# Patient Record
Sex: FEMALE | Race: WHITE
[De-identification: names, ages, dates, MRNs, and addresses within clinical notes are randomized per-mention and may not be internally consistent; named-entity substitution may affect disease eponyms.]

---

## 2019-08-05 ENCOUNTER — HOSPITAL ENCOUNTER (OUTPATIENT)
Dept: HOSPITAL 95 - LAB SHORT | Age: 64
Discharge: HOME | End: 2019-08-05
Attending: INTERNAL MEDICINE
Payer: MEDICARE

## 2019-08-05 DIAGNOSIS — Z00.01: Primary | ICD-10-CM

## 2019-08-07 LAB — PROT UR-MCNC: 5.9 MG/DL

## 2019-09-06 ENCOUNTER — HOSPITAL ENCOUNTER (OUTPATIENT)
Dept: HOSPITAL 95 - ORSCSDS | Age: 64
Discharge: HOME | End: 2019-09-06
Attending: INTERNAL MEDICINE
Payer: MEDICARE

## 2019-09-06 VITALS — HEIGHT: 65.98 IN | BODY MASS INDEX: 32.14 KG/M2 | WEIGHT: 199.96 LBS

## 2019-09-06 DIAGNOSIS — E11.9: ICD-10-CM

## 2019-09-06 DIAGNOSIS — K64.8: ICD-10-CM

## 2019-09-06 DIAGNOSIS — Z87.891: ICD-10-CM

## 2019-09-06 DIAGNOSIS — D12.4: ICD-10-CM

## 2019-09-06 DIAGNOSIS — D12.0: ICD-10-CM

## 2019-09-06 DIAGNOSIS — K57.30: ICD-10-CM

## 2019-09-06 DIAGNOSIS — J44.9: ICD-10-CM

## 2019-09-06 DIAGNOSIS — D12.2: ICD-10-CM

## 2019-09-06 DIAGNOSIS — K92.1: Primary | ICD-10-CM

## 2019-09-06 DIAGNOSIS — I10: ICD-10-CM

## 2019-09-06 DIAGNOSIS — Z79.899: ICD-10-CM

## 2019-09-06 DIAGNOSIS — E78.5: ICD-10-CM

## 2019-09-06 DIAGNOSIS — D12.8: ICD-10-CM

## 2019-09-06 PROCEDURE — 0DBK8ZX EXCISION OF ASCENDING COLON, VIA NATURAL OR ARTIFICIAL OPENING ENDOSCOPIC, DIAGNOSTIC: ICD-10-PCS | Performed by: INTERNAL MEDICINE

## 2019-09-06 PROCEDURE — 0DBH8ZX EXCISION OF CECUM, VIA NATURAL OR ARTIFICIAL OPENING ENDOSCOPIC, DIAGNOSTIC: ICD-10-PCS | Performed by: INTERNAL MEDICINE

## 2019-09-06 PROCEDURE — 0DBP8ZX EXCISION OF RECTUM, VIA NATURAL OR ARTIFICIAL OPENING ENDOSCOPIC, DIAGNOSTIC: ICD-10-PCS | Performed by: INTERNAL MEDICINE

## 2019-09-06 PROCEDURE — 0DBM8ZX EXCISION OF DESCENDING COLON, VIA NATURAL OR ARTIFICIAL OPENING ENDOSCOPIC, DIAGNOSTIC: ICD-10-PCS | Performed by: INTERNAL MEDICINE

## 2019-12-04 ENCOUNTER — HOSPITAL ENCOUNTER (OUTPATIENT)
Dept: HOSPITAL 95 - ORSCSDS | Age: 64
Discharge: HOME | End: 2019-12-04
Attending: OTOLARYNGOLOGY
Payer: MEDICARE

## 2019-12-04 VITALS — HEIGHT: 65.98 IN | BODY MASS INDEX: 34.69 KG/M2 | WEIGHT: 215.83 LBS

## 2019-12-04 DIAGNOSIS — J44.9: ICD-10-CM

## 2019-12-04 DIAGNOSIS — E66.9: ICD-10-CM

## 2019-12-04 DIAGNOSIS — E11.9: ICD-10-CM

## 2019-12-04 DIAGNOSIS — Z79.899: ICD-10-CM

## 2019-12-04 DIAGNOSIS — D37.02: Primary | ICD-10-CM

## 2019-12-04 DIAGNOSIS — Z87.891: ICD-10-CM

## 2019-12-04 DIAGNOSIS — I10: ICD-10-CM

## 2019-12-04 PROCEDURE — 0CBM8ZX EXCISION OF PHARYNX, VIA NATURAL OR ARTIFICIAL OPENING ENDOSCOPIC, DIAGNOSTIC: ICD-10-PCS | Performed by: OTOLARYNGOLOGY

## 2019-12-04 NOTE — NUR
12/04/19 1225 Haritha Robbins
TOLERATED JUICE, NO C/O PAIN  OR NAUSEA T/O RECOVERY. AMBU TO CAR
W/ERNESTINA DYSON HAS MEDS AT HOME

## 2019-12-04 NOTE — NUR
12/04/19 1122 Haritha Robbins
PT WAS SUCTIONED OF SCANT AMT BLOOD TINGED MUCOUS, HAS PRODUCTIVE
COUGH, DUONEB TX AT 1107. RHONCHI CLEARED WITH COUGH

## 2019-12-26 ENCOUNTER — HOSPITAL ENCOUNTER (OUTPATIENT)
Dept: HOSPITAL 95 - ATC | Age: 64
Discharge: HOME | End: 2019-12-26
Attending: INTERNAL MEDICINE
Payer: MEDICARE

## 2019-12-26 DIAGNOSIS — Z87.891: ICD-10-CM

## 2019-12-26 DIAGNOSIS — Z79.899: ICD-10-CM

## 2019-12-26 DIAGNOSIS — G89.4: ICD-10-CM

## 2019-12-26 DIAGNOSIS — G43.909: ICD-10-CM

## 2019-12-26 DIAGNOSIS — C34.11: Primary | ICD-10-CM

## 2019-12-26 DIAGNOSIS — I10: ICD-10-CM

## 2019-12-26 DIAGNOSIS — Z91.048: ICD-10-CM

## 2019-12-26 DIAGNOSIS — Z79.84: ICD-10-CM

## 2019-12-26 DIAGNOSIS — F32.9: ICD-10-CM

## 2019-12-26 DIAGNOSIS — M79.7: ICD-10-CM

## 2019-12-26 DIAGNOSIS — C77.1: ICD-10-CM

## 2019-12-26 DIAGNOSIS — E78.5: ICD-10-CM

## 2019-12-26 DIAGNOSIS — E11.9: ICD-10-CM

## 2019-12-31 ENCOUNTER — HOSPITAL ENCOUNTER (OUTPATIENT)
Dept: HOSPITAL 95 - LAB | Age: 64
Discharge: HOME | End: 2019-12-31
Attending: INTERNAL MEDICINE
Payer: MEDICARE

## 2019-12-31 DIAGNOSIS — C34.90: Primary | ICD-10-CM

## 2019-12-31 LAB
ALBUMIN SERPL BCP-MCNC: 3.4 G/DL (ref 3.4–5)
ALBUMIN/GLOB SERPL: 0.8 {RATIO} (ref 0.8–1.8)
ALT SERPL W P-5'-P-CCNC: 36 U/L (ref 12–78)
ANION GAP SERPL CALCULATED.4IONS-SCNC: 10 MMOL/L (ref 6–16)
AST SERPL W P-5'-P-CCNC: 16 U/L (ref 12–37)
BASOPHILS # BLD AUTO: 0.02 K/MM3 (ref 0–0.23)
BASOPHILS NFR BLD AUTO: 0 % (ref 0–2)
BILIRUB SERPL-MCNC: 1 MG/DL (ref 0.1–1)
BUN SERPL-MCNC: 29 MG/DL (ref 8–24)
CALCIUM SERPL-MCNC: 9.4 MG/DL (ref 8.5–10.1)
CHLORIDE SERPL-SCNC: 99 MMOL/L (ref 98–108)
CO2 SERPL-SCNC: 25 MMOL/L (ref 21–32)
CREAT SERPL-MCNC: 1.25 MG/DL (ref 0.4–1)
DEPRECATED RDW RBC AUTO: 38.2 FL (ref 35.1–46.3)
EOSINOPHIL # BLD AUTO: 0.03 K/MM3 (ref 0–0.68)
EOSINOPHIL NFR BLD AUTO: 1 % (ref 0–6)
ERYTHROCYTE [DISTWIDTH] IN BLOOD BY AUTOMATED COUNT: 12.2 % (ref 11.7–14.2)
GLOBULIN SER CALC-MCNC: 4 G/DL (ref 2.2–4)
GLUCOSE SERPL-MCNC: 230 MG/DL (ref 70–99)
HCT VFR BLD AUTO: 38.2 % (ref 33–51)
HGB BLD-MCNC: 12.2 G/DL (ref 11.5–16)
IMM GRANULOCYTES # BLD AUTO: 0.03 K/MM3 (ref 0–0.1)
IMM GRANULOCYTES NFR BLD AUTO: 1 % (ref 0–1)
LYMPHOCYTES # BLD AUTO: 0.89 K/MM3 (ref 0.84–5.2)
LYMPHOCYTES NFR BLD AUTO: 19 % (ref 21–46)
MAGNESIUM SERPL-MCNC: 1.4 MG/DL (ref 1.6–2.4)
MCHC RBC AUTO-ENTMCNC: 31.9 G/DL (ref 31.5–36.5)
MCV RBC AUTO: 86 FL (ref 80–100)
MONOCYTES # BLD AUTO: 0.12 K/MM3 (ref 0.16–1.47)
MONOCYTES NFR BLD AUTO: 3 % (ref 4–13)
NEUTROPHILS # BLD AUTO: 3.64 K/MM3 (ref 1.96–9.15)
NEUTROPHILS NFR BLD AUTO: 77 % (ref 41–73)
NRBC # BLD AUTO: 0 K/MM3 (ref 0–0.02)
NRBC BLD AUTO-RTO: 0 /100 WBC (ref 0–0.2)
PLATELET # BLD AUTO: 257 K/MM3 (ref 150–400)
POTASSIUM SERPL-SCNC: 3.8 MMOL/L (ref 3.5–5.5)
PROT SERPL-MCNC: 7.4 G/DL (ref 6.4–8.2)
SODIUM SERPL-SCNC: 134 MMOL/L (ref 136–145)

## 2020-01-10 ENCOUNTER — HOSPITAL ENCOUNTER (OUTPATIENT)
Dept: HOSPITAL 95 - ORSCMMR | Age: 65
Discharge: HOME | End: 2020-01-10
Attending: SURGERY
Payer: MEDICARE

## 2020-01-10 VITALS — WEIGHT: 202.6 LBS | HEIGHT: 65.98 IN | BODY MASS INDEX: 32.56 KG/M2

## 2020-01-10 DIAGNOSIS — C34.11: Primary | ICD-10-CM

## 2020-01-10 DIAGNOSIS — Z79.84: ICD-10-CM

## 2020-01-10 DIAGNOSIS — E78.5: ICD-10-CM

## 2020-01-10 DIAGNOSIS — Z79.899: ICD-10-CM

## 2020-01-10 DIAGNOSIS — E11.9: ICD-10-CM

## 2020-01-10 DIAGNOSIS — I10: ICD-10-CM

## 2020-01-10 DIAGNOSIS — F17.210: ICD-10-CM

## 2020-01-10 DIAGNOSIS — J44.9: ICD-10-CM

## 2020-01-10 PROCEDURE — B543ZZA ULTRASONOGRAPHY OF RIGHT JUGULAR VEINS, GUIDANCE: ICD-10-PCS | Performed by: SURGERY

## 2020-01-10 PROCEDURE — C1788 PORT, INDWELLING, IMP: HCPCS

## 2020-01-10 PROCEDURE — 05HM33Z INSERTION OF INFUSION DEVICE INTO RIGHT INTERNAL JUGULAR VEIN, PERCUTANEOUS APPROACH: ICD-10-PCS | Performed by: SURGERY

## 2020-01-10 NOTE — NUR
Patient up to Ambulate independently. Gait steady.
Discharge instructions reviewed with patient. Patient verbalizes understanding.
Copy given to patient to take home.
Discharged via wheelchair to private car for ride home WITH SPOUSE

## 2020-01-10 NOTE — NUR
DAY SURGERY RN | TO OR
 
BOTH DOCTORS AND CIRCULATOR + STUDENTS HAVE SEEN PATIENT. REPORT TO GORGE RIVERA.
NO ISSUES.  IN WAITING AREA.

## 2020-01-13 ENCOUNTER — HOSPITAL ENCOUNTER (OUTPATIENT)
Dept: HOSPITAL 95 - LAB SHORT | Age: 65
Discharge: HOME | End: 2020-01-13
Attending: INTERNAL MEDICINE
Payer: MEDICARE

## 2020-01-13 DIAGNOSIS — C34.90: Primary | ICD-10-CM

## 2020-01-13 LAB
BASOPHILS # BLD: 0 K/MM3 (ref 0–0.23)
BASOPHILS NFR BLD: 0 % (ref 0–2)
DEPRECATED RDW RBC AUTO: 41.5 FL (ref 35.1–46.3)
EOSINOPHIL # BLD: 0.07 K/MM3 (ref 0–0.68)
EOSINOPHIL NFR BLD: 1 % (ref 0–6)
ERYTHROCYTE [DISTWIDTH] IN BLOOD BY AUTOMATED COUNT: 13.2 % (ref 11.7–14.2)
HCT VFR BLD AUTO: 32.7 % (ref 33–51)
HGB BLD-MCNC: 10.4 G/DL (ref 11.5–16)
LYMPHOCYTES # BLD: 1.22 K/MM3 (ref 0.84–5.2)
LYMPHOCYTES NFR BLD: 17 % (ref 21–46)
MCHC RBC AUTO-ENTMCNC: 31.8 G/DL (ref 31.5–36.5)
MCV RBC AUTO: 87 FL (ref 80–100)
METAMYELOCYTES # BLD MANUAL: 0.28 K/MM3 (ref 0–0)
METAMYELOCYTES NFR BLD MANUAL: 4 % (ref 0–0)
MONOCYTES # BLD: 0.71 K/MM3 (ref 0.16–1.47)
MONOCYTES NFR BLD: 10 % (ref 4–13)
MYELOCYTES # BLD MANUAL: 0.07 K/MM3 (ref 0–0)
MYELOCYTES NFR BLD MANUAL: 1 % (ref 0–0)
NEUTS BAND NFR BLD MANUAL: 6 % (ref 0–8)
NEUTS SEG # BLD MANUAL: 4.81 K/MM3 (ref 1.96–9.15)
NEUTS SEG NFR BLD MANUAL: 61 % (ref 41–73)
NRBC # BLD AUTO: 0 K/MM3 (ref 0–0.02)
NRBC BLD AUTO-RTO: 0 /100 WBC (ref 0–0.2)
PLATELET # BLD AUTO: 360 K/MM3 (ref 150–400)
TOTAL CELLS COUNTED BLD: 100

## 2020-02-10 ENCOUNTER — HOSPITAL ENCOUNTER (OUTPATIENT)
Dept: HOSPITAL 95 - LAB | Age: 65
Discharge: HOME | End: 2020-02-10
Attending: INTERNAL MEDICINE
Payer: MEDICARE

## 2020-02-10 DIAGNOSIS — C34.90: Primary | ICD-10-CM

## 2020-02-10 LAB
ANION GAP SERPL CALCULATED.4IONS-SCNC: 10 MMOL/L (ref 6–16)
BASOPHILS # BLD: 0 K/MM3 (ref 0–0.23)
BASOPHILS NFR BLD: 0 % (ref 0–2)
BUN SERPL-MCNC: 34 MG/DL (ref 8–24)
CALCIUM SERPL-MCNC: 7.8 MG/DL (ref 8.5–10.1)
CHLORIDE SERPL-SCNC: 101 MMOL/L (ref 98–108)
CO2 SERPL-SCNC: 25 MMOL/L (ref 21–32)
CREAT SERPL-MCNC: 1.2 MG/DL (ref 0.4–1)
DEPRECATED RDW RBC AUTO: 45.7 FL (ref 35.1–46.3)
EOSINOPHIL # BLD: 0.08 K/MM3 (ref 0–0.68)
EOSINOPHIL NFR BLD: 2 % (ref 0–6)
ERYTHROCYTE [DISTWIDTH] IN BLOOD BY AUTOMATED COUNT: 14.6 % (ref 11.7–14.2)
GLUCOSE SERPL-MCNC: 232 MG/DL (ref 70–99)
HCT VFR BLD AUTO: 30.4 % (ref 33–51)
HGB BLD-MCNC: 9.8 G/DL (ref 11.5–16)
LYMPHOCYTES # BLD: 0.55 K/MM3 (ref 0.84–5.2)
LYMPHOCYTES NFR BLD: 13 % (ref 21–46)
MCHC RBC AUTO-ENTMCNC: 32.2 G/DL (ref 31.5–36.5)
MCV RBC AUTO: 88 FL (ref 80–100)
MONOCYTES # BLD: 0 K/MM3 (ref 0.16–1.47)
MONOCYTES NFR BLD: 0 % (ref 4–13)
NEUTS BAND NFR BLD MANUAL: 3 % (ref 0–8)
NEUTS SEG # BLD MANUAL: 3.63 K/MM3 (ref 1.96–9.15)
NEUTS SEG NFR BLD MANUAL: 82 % (ref 41–73)
NRBC # BLD AUTO: 0 K/MM3 (ref 0–0.02)
NRBC BLD AUTO-RTO: 0 /100 WBC (ref 0–0.2)
PLATELET # BLD AUTO: 171 K/MM3 (ref 150–400)
POTASSIUM SERPL-SCNC: 3.5 MMOL/L (ref 3.5–5.5)
SODIUM SERPL-SCNC: 136 MMOL/L (ref 136–145)
TOTAL CELLS COUNTED BLD: 100

## 2020-03-30 ENCOUNTER — HOSPITAL ENCOUNTER (OUTPATIENT)
Dept: HOSPITAL 95 - ATC | Age: 65
Discharge: HOME | End: 2020-03-30
Attending: SPECIALIST
Payer: MEDICARE

## 2020-03-30 DIAGNOSIS — Z79.84: ICD-10-CM

## 2020-03-30 DIAGNOSIS — N18.3: ICD-10-CM

## 2020-03-30 DIAGNOSIS — I12.9: ICD-10-CM

## 2020-03-30 DIAGNOSIS — Z79.899: ICD-10-CM

## 2020-03-30 DIAGNOSIS — E83.42: Primary | ICD-10-CM

## 2020-03-30 DIAGNOSIS — E11.22: ICD-10-CM

## 2020-03-30 DIAGNOSIS — Z88.8: ICD-10-CM

## 2020-03-30 DIAGNOSIS — D63.1: ICD-10-CM

## 2020-04-01 ENCOUNTER — HOSPITAL ENCOUNTER (OUTPATIENT)
Dept: HOSPITAL 95 - ATC | Age: 65
Discharge: HOME | End: 2020-04-01
Attending: SPECIALIST
Payer: MEDICARE

## 2020-04-01 DIAGNOSIS — E11.22: ICD-10-CM

## 2020-04-01 DIAGNOSIS — Z79.899: ICD-10-CM

## 2020-04-01 DIAGNOSIS — Z88.8: ICD-10-CM

## 2020-04-01 DIAGNOSIS — D63.1: ICD-10-CM

## 2020-04-01 DIAGNOSIS — N18.3: ICD-10-CM

## 2020-04-01 DIAGNOSIS — I12.9: ICD-10-CM

## 2020-04-01 DIAGNOSIS — Z79.84: ICD-10-CM

## 2020-04-01 DIAGNOSIS — E83.42: Primary | ICD-10-CM

## 2020-04-03 ENCOUNTER — HOSPITAL ENCOUNTER (OUTPATIENT)
Dept: HOSPITAL 95 - LAB SHORT | Age: 65
Discharge: HOME | End: 2020-04-03
Attending: SPECIALIST
Payer: MEDICARE

## 2020-04-03 DIAGNOSIS — G60.9: ICD-10-CM

## 2020-04-03 DIAGNOSIS — R76.9: ICD-10-CM

## 2020-04-03 DIAGNOSIS — E55.9: ICD-10-CM

## 2020-04-03 DIAGNOSIS — Z79.01: Primary | ICD-10-CM

## 2020-04-03 DIAGNOSIS — R94.5: ICD-10-CM

## 2020-04-03 DIAGNOSIS — D50.9: ICD-10-CM

## 2020-04-03 DIAGNOSIS — D51.8: ICD-10-CM

## 2020-04-03 DIAGNOSIS — R94.6: ICD-10-CM

## 2020-04-03 DIAGNOSIS — E78.00: ICD-10-CM

## 2020-04-03 DIAGNOSIS — D52.8: ICD-10-CM

## 2020-04-03 DIAGNOSIS — D63.1: ICD-10-CM

## 2020-04-03 DIAGNOSIS — Z51.81: ICD-10-CM

## 2020-04-03 DIAGNOSIS — N18.3: ICD-10-CM

## 2020-04-03 DIAGNOSIS — N25.81: ICD-10-CM

## 2020-04-03 LAB
MICROALBUMIN 24H UR-MCNC: 14.1 MG/L (ref 0–20)
PROT UR-MCNC: 6.8 MG/DL (ref 0–11.9)

## 2021-02-15 ENCOUNTER — HOSPITAL ENCOUNTER (EMERGENCY)
Dept: HOSPITAL 95 - ER | Age: 66
Discharge: HOME | End: 2021-02-15
Payer: MEDICARE

## 2021-02-15 VITALS — HEIGHT: 67 IN | BODY MASS INDEX: 14.91 KG/M2 | WEIGHT: 95 LBS

## 2021-02-15 DIAGNOSIS — Z79.899: ICD-10-CM
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
## 2021-02-15 DIAGNOSIS — S80.211A: ICD-10-CM

## 2021-02-15 DIAGNOSIS — S00.511A: Primary | ICD-10-CM

## 2021-02-15 DIAGNOSIS — Z79.84: ICD-10-CM

## 2021-02-15 DIAGNOSIS — Z87.891: ICD-10-CM
